# Patient Record
Sex: FEMALE | Race: BLACK OR AFRICAN AMERICAN | Employment: FULL TIME | URBAN - METROPOLITAN AREA
[De-identification: names, ages, dates, MRNs, and addresses within clinical notes are randomized per-mention and may not be internally consistent; named-entity substitution may affect disease eponyms.]

---

## 2017-09-25 ENCOUNTER — OFFICE VISIT (OUTPATIENT)
Dept: INTERNAL MEDICINE CLINIC | Age: 27
End: 2017-09-25

## 2017-09-25 VITALS
SYSTOLIC BLOOD PRESSURE: 109 MMHG | HEART RATE: 89 BPM | WEIGHT: 177.7 LBS | TEMPERATURE: 98 F | BODY MASS INDEX: 27.89 KG/M2 | HEIGHT: 67 IN | RESPIRATION RATE: 14 BRPM | OXYGEN SATURATION: 98 % | DIASTOLIC BLOOD PRESSURE: 77 MMHG

## 2017-09-25 DIAGNOSIS — M54.5 LOW BACK PAIN, UNSPECIFIED BACK PAIN LATERALITY, UNSPECIFIED CHRONICITY, WITH SCIATICA PRESENCE UNSPECIFIED: Primary | ICD-10-CM

## 2017-09-25 DIAGNOSIS — G43.909 MIGRAINE WITHOUT STATUS MIGRAINOSUS, NOT INTRACTABLE, UNSPECIFIED MIGRAINE TYPE: ICD-10-CM

## 2017-09-25 RX ORDER — TRAMADOL HYDROCHLORIDE 200 MG/1
TABLET, FILM COATED, EXTENDED RELEASE ORAL
COMMUNITY
End: 2017-09-25 | Stop reason: SDUPTHER

## 2017-09-25 RX ORDER — TOPIRAMATE 100 MG/1
TABLET, FILM COATED ORAL 2 TIMES DAILY
Status: ON HOLD | COMMUNITY
End: 2018-10-29

## 2017-09-25 RX ORDER — RIZATRIPTAN BENZOATE 10 MG/1
10 TABLET ORAL
COMMUNITY
End: 2017-09-25 | Stop reason: SDUPTHER

## 2017-09-25 RX ORDER — ACETAMINOPHEN AND CODEINE PHOSPHATE 120; 12 MG/5ML; MG/5ML
SOLUTION ORAL
Status: ON HOLD | COMMUNITY
End: 2018-10-29

## 2017-09-25 RX ORDER — BACLOFEN 10 MG/1
10 TABLET ORAL
Qty: 90 TAB | Refills: 0 | Status: ON HOLD | OUTPATIENT
Start: 2017-09-25 | End: 2018-10-29

## 2017-09-25 RX ORDER — TRAMADOL HYDROCHLORIDE 200 MG/1
1 TABLET, FILM COATED, EXTENDED RELEASE ORAL DAILY
Qty: 30 TAB | Refills: 0 | Status: ON HOLD | OUTPATIENT
Start: 2017-09-25 | End: 2018-10-29

## 2017-09-25 RX ORDER — RIZATRIPTAN BENZOATE 10 MG/1
10 TABLET ORAL
Qty: 5 TAB | Refills: 0 | Status: ON HOLD | OUTPATIENT
Start: 2017-09-25 | End: 2018-10-29

## 2017-09-25 RX ORDER — CEFUROXIME AXETIL 250 MG/1
6 TABLET ORAL
COMMUNITY
End: 2017-09-25 | Stop reason: SDUPTHER

## 2017-09-25 RX ORDER — CEFUROXIME AXETIL 250 MG/1
6 TABLET ORAL
Qty: 5 KIT | Refills: 0 | Status: ON HOLD | OUTPATIENT
Start: 2017-09-25 | End: 2018-10-29

## 2017-09-25 NOTE — PROGRESS NOTES
FAMILY MEDICINE CLINIC NOTE    S: The patient presents with a complaint of chronic back pain, describes back pain as constant lumbar, bilateral. She was taking tramadol for her back pain. She is a regular patient of the [place] with problems of hands and feet going numb intermittently. She is having difficulty with migraines, now having 5-6 per week. She was taking injectable sumatriptan and maxalt and topamax. Topamax causes nausea and she does not want to take this any longer. She was previously breastfeeding but has now stopped, she understands she should not breast feed on these medications. She denies symptoms of serotonin syndrome, has been taking these medications in combination for the last 6 years. She would like a referral to pain management. O:  Visit Vitals    /77    Pulse 89    Temp 98 °F (36.7 °C) (Oral)    Resp 14    Ht 5' 7\" (1.702 m)    Wt 177 lb 11.2 oz (80.6 kg)    SpO2 98%    BMI 27.83 kg/m2     NAD, comfortable  No LAD  RRR, no murmurs  CTABL, no wheezing/ronchi/rales  Spasms of the posterior cervical musculature and bilateral thoracolumbar musculature  No edema    27 y.o. female      ICD-10-CM ICD-9-CM    1. Low back pain, unspecified back pain laterality, unspecified chronicity, with sciatica presence unspecified M54.5 724.2 traMADol 200 mg TM24      baclofen (LIORESAL) 10 mg tablet      REFERRAL TO PAIN MANAGEMENT   2.  Migraine without status migrainosus, not intractable, unspecified migraine type G43.909 346.90 SUMAtriptan succinate (IMITREX STATDOSE PEN) 6 mg/0.5 mL kit      rizatriptan (MAXALT) 10 mg tablet      REFERRAL TO PAIN MANAGEMENT

## 2017-09-25 NOTE — PROGRESS NOTES
Chief Complaint   Patient presents with    New Patient     [facility], hoping to get a refill for Tramadol for her back pain, and Imatrex, Maxalt, Topamax for migraines       Pt preferred language for health care discussion is [place]. Is someone accompanying this pt? no    Is the patient using any DME equipment during OV? no    Depression Screening:  PHQ over the last two weeks 9/25/2017   Little interest or pleasure in doing things Not at all   Feeling down, depressed or hopeless Not at all   Total Score PHQ 2 0       Learning Assessment:  Learning Assessment 9/25/2017   PRIMARY LEARNER Patient   HIGHEST LEVEL OF EDUCATION - PRIMARY LEARNER  SOME COLLEGE   PRIMARY LANGUAGE ENGLISH   LEARNER PREFERENCE PRIMARY READING   ANSWERED BY GILES Yuan   RELATIONSHIP SELF       Abuse Screening:  Abuse Screening Questionnaire 9/25/2017   Do you ever feel afraid of your partner? N   Are you in a relationship with someone who physically or mentally threatens you? N   Is it safe for you to go home? Y         Health Maintenance reviewed and discussed per provider. Yes, pts first visit any and all HM discussed with and ordered per Provider    Pt currently taking Antiplatelet therapy? no      Advance Directive:  1. Do you have an advance directive in place? Patient Reply:no    2. If not, would you like material regarding how to put one in place? Patient Reply: no    Coordination of Care:  1. Have you been to the ER, urgent care clinic since your last visit? Hospitalized since your last visit? no    2. Have you seen or consulted any other health care providers outside of the [address] [place] [address] since your last visit? Include any pap smears or colon screening.  no

## 2017-09-25 NOTE — MR AVS SNAPSHOT
Visit Information Date & Time Provider Department Dept. Phone Encounter #  
 9/25/2017 10:00 AM Anupama Nicole Grupo A [phone] [id number] Follow-up Instructions Return if symptoms worsen or fail to improve. Upcoming Health Maintenance Date Due DTaP/Tdap/Td series (1 - Tdap) 11/28/2009 PAP AKA CERVICAL CYTOLOGY 11/28/2009 INFLUENZA AGE 9 TO ADULT 8/1/2017 Allergies as of 9/25/2017  Review Complete On: 9/25/2017 By: Anupama Nicole MD  
 No Known Allergies Current Immunizations  Never Reviewed No immunizations on file. Not reviewed this visit You Were Diagnosed With   
  
 Codes Comments Low back pain, unspecified back pain laterality, unspecified chronicity, with sciatica presence unspecified    -  Primary ICD-10-CM: M54.5 ICD-9-CM: 724.2 Migraine without status migrainosus, not intractable, unspecified migraine type     ICD-10-CM: G43.909 ICD-9-CM: 346.90 Vitals BP Pulse Temp Resp Height(growth percentile) Weight(growth percentile) 109/77 89 98 °F (36.7 °C) (Oral) 14 5' 7\" (1.702 m) 177 lb 11.2 oz (80.6 kg) LMP SpO2 BMI Smoking Status 08/26/2017 98% 27.83 kg/m2 Never Smoker BMI and BSA Data Body Mass Index Body Surface Area  
 27.83 kg/m 2 1.95 m 2 Your Updated Medication List  
  
   
This list is accurate as of: 9/25/17 11:14 AM.  Always use your most recent med list.  
  
  
  
  
 baclofen 10 mg tablet Commonly known as:  LIORESAL Take 1 Tab by mouth three (3) times daily as needed. DAKOTA-BE 0.35 mg Tab Generic drug:  norethindrone Take  by mouth.  
  
 rizatriptan 10 mg tablet Commonly known as:  Tayler Callas Take 1 Tab by mouth once as needed for Migraine. May repeat in 2 hours if needed SUMAtriptan succinate 6 mg/0.5 mL kit Commonly known as:  IMITREX STATDOSE PEN  
6 mg by SubCUTAneous route once as needed for Migraine. TOPAMAX 100 mg tablet Generic drug:  topiramate Take  by mouth two (2) times a day. traMADol 200 mg Tm24 Take 1 Tab by mouth daily. Max Daily Amount: 1 Tab. Prescriptions Printed Refills SUMAtriptan succinate (IMITREX STATDOSE PEN) 6 mg/0.5 mL kit 0 Si mg by SubCUTAneous route once as needed for Migraine. Class: Print Route: SubCUTAneous  
 rizatriptan (MAXALT) 10 mg tablet 0 Sig: Take 1 Tab by mouth once as needed for Migraine. May repeat in 2 hours if needed Class: Print Route: Oral  
 traMADol 200 mg TM24 0 Sig: Take 1 Tab by mouth daily. Max Daily Amount: 1 Tab. Class: Print Route: Oral  
 baclofen (LIORESAL) 10 mg tablet 0 Sig: Take 1 Tab by mouth three (3) times daily as needed. Class: Print Route: Oral  
  
We Performed the Following REFERRAL TO PAIN MANAGEMENT [MIE919 Custom] Comments:  
 Chronic lumbar pain and chronic migraines Follow-up Instructions Return if symptoms worsen or fail to improve. Referral Information Referral ID Referred By Referred To  
  
 [id number] Carry Balzarine R Not Available Visits Status Start Date End Date 1 New Request 17 If your referral has a status of pending review or denied, additional information will be sent to support the outcome of this decision. Introducing [facility]! Garcia Meléndez introduces [employer] patient portal. Now you can access parts of your medical record, email your doctor's office, and request medication refills online. 1. In your internet browser, go to [email or web address] 2. Click on the First Time User? Click Here link in the Sign In box. You will see the New Member Sign Up page. 3. Enter your [employer] Access Code exactly as it appears below. You will not need to use this code after youve completed the sign-up process.  If you do not sign up before the expiration date, you must request a new code. 
 
· [employer] Access Code: [email or web address] Expires: 12/24/2017 11:04 AM 
 
4. Enter the last four digits of your Social Security Number (xxxx) and Date of Birth (mm/dd/yyyy) as indicated and click Submit. You will be taken to the next sign-up page. 5. Create a Shoutlet ID. This will be your [employer]outlet login ID and cannot be changed, so think of one that is secure and easy to remember. 6. Create a [employer]outlet password. You can change your password at any time. 7. Enter your Password Reset Question and Answer. This can be used at a later time if you forget your password. 8. Enter your e-mail address. You will receive e-mail notification when new information is available in [address] 9. Click Sign Up. You can now view and download portions of your medical record. 10. Click the Download Summary menu link to download a portable copy of your medical information. If you have questions, please visit the Frequently Asked Questions section of the [employer] website. Remember, [employer] is NOT to be used for urgent needs. For medical emergencies, dial 911. Now available from your iPhone and Android! Please provide this summary of care documentation to your next provider. If you have any questions after today's visit, please call [phone].

## 2018-10-29 PROBLEM — Z34.90 PREGNANCY: Status: ACTIVE | Noted: 2018-10-29
